# Patient Record
Sex: FEMALE | Race: BLACK OR AFRICAN AMERICAN | NOT HISPANIC OR LATINO
[De-identification: names, ages, dates, MRNs, and addresses within clinical notes are randomized per-mention and may not be internally consistent; named-entity substitution may affect disease eponyms.]

---

## 2017-12-21 ENCOUNTER — APPOINTMENT (OUTPATIENT)
Dept: PULMONOLOGY | Facility: CLINIC | Age: 77
End: 2017-12-21
Payer: MEDICARE

## 2017-12-21 VITALS
OXYGEN SATURATION: 98 % | WEIGHT: 122 LBS | HEART RATE: 58 BPM | SYSTOLIC BLOOD PRESSURE: 120 MMHG | BODY MASS INDEX: 22.31 KG/M2 | DIASTOLIC BLOOD PRESSURE: 60 MMHG

## 2017-12-21 DIAGNOSIS — Z86.39 PERSONAL HISTORY OF OTHER ENDOCRINE, NUTRITIONAL AND METABOLIC DISEASE: ICD-10-CM

## 2017-12-21 DIAGNOSIS — Z87.39 PERSONAL HISTORY OF OTHER DISEASES OF THE MUSCULOSKELETAL SYSTEM AND CONNECTIVE TISSUE: ICD-10-CM

## 2017-12-21 DIAGNOSIS — Z86.79 PERSONAL HISTORY OF OTHER DISEASES OF THE CIRCULATORY SYSTEM: ICD-10-CM

## 2017-12-21 DIAGNOSIS — Z00.00 ENCOUNTER FOR GENERAL ADULT MEDICAL EXAMINATION W/OUT ABNORMAL FINDINGS: ICD-10-CM

## 2017-12-21 DIAGNOSIS — K22.5 DIVERTICULUM OF ESOPHAGUS, ACQUIRED: ICD-10-CM

## 2017-12-21 DIAGNOSIS — R06.02 SHORTNESS OF BREATH: ICD-10-CM

## 2017-12-21 DIAGNOSIS — Z80.3 FAMILY HISTORY OF MALIGNANT NEOPLASM OF BREAST: ICD-10-CM

## 2017-12-21 DIAGNOSIS — Z82.49 FAMILY HISTORY OF ISCHEMIC HEART DISEASE AND OTHER DISEASES OF THE CIRCULATORY SYSTEM: ICD-10-CM

## 2017-12-21 PROCEDURE — 94010 BREATHING CAPACITY TEST: CPT

## 2017-12-21 PROCEDURE — 94729 DIFFUSING CAPACITY: CPT

## 2017-12-21 PROCEDURE — 85018 HEMOGLOBIN: CPT | Mod: QW

## 2017-12-21 PROCEDURE — 99205 OFFICE O/P NEW HI 60 MIN: CPT | Mod: 25

## 2017-12-21 PROCEDURE — 94727 GAS DIL/WSHOT DETER LNG VOL: CPT

## 2021-10-08 ENCOUNTER — RESULT REVIEW (OUTPATIENT)
Age: 81
End: 2021-10-08

## 2023-03-14 ENCOUNTER — OFFICE (OUTPATIENT)
Dept: URBAN - METROPOLITAN AREA CLINIC 104 | Facility: CLINIC | Age: 83
Setting detail: OPHTHALMOLOGY
End: 2023-03-14
Payer: COMMERCIAL

## 2023-03-14 DIAGNOSIS — E11.9: ICD-10-CM

## 2023-03-14 DIAGNOSIS — H18.513: ICD-10-CM

## 2023-03-14 DIAGNOSIS — H04.121: ICD-10-CM

## 2023-03-14 DIAGNOSIS — H21.233: ICD-10-CM

## 2023-03-14 DIAGNOSIS — H26.493: ICD-10-CM

## 2023-03-14 DIAGNOSIS — H40.1133: ICD-10-CM

## 2023-03-14 DIAGNOSIS — H43.813: ICD-10-CM

## 2023-03-14 DIAGNOSIS — Z96.1: ICD-10-CM

## 2023-03-14 PROCEDURE — 99213 OFFICE O/P EST LOW 20 MIN: CPT | Performed by: SPECIALIST

## 2023-03-14 PROCEDURE — 92250 FUNDUS PHOTOGRAPHY W/I&R: CPT | Performed by: SPECIALIST

## 2023-03-14 ASSESSMENT — REFRACTION_CURRENTRX
OS_ADD: +2.75
OD_ADD: +2.75
OD_SPHERE: -0.25
OD_OVR_VA: 20/
OD_AXIS: 50
OS_SPHERE: -0.75
OD_VPRISM_DIRECTION: BF
OS_VPRISM_DIRECTION: BF
OS_OVR_VA: 20/
OD_CYLINDER: -0.25

## 2023-03-14 ASSESSMENT — REFRACTION_AUTOREFRACTION
OS_SPHERE: PLANO
OS_AXIS: 68
OS_CYLINDER: -0.50
OD_AXIS: 89
OD_CYLINDER: -0.50
OD_SPHERE: PLANO

## 2023-03-14 ASSESSMENT — CONFRONTATIONAL VISUAL FIELD TEST (CVF)
OD_FINDINGS: FULL
OS_FINDINGS: FULL

## 2023-03-14 ASSESSMENT — VISUAL ACUITY
OS_BCVA: 20/20
OD_BCVA: 20/25

## 2023-03-14 ASSESSMENT — PACHYMETRY
OD_CT_UM: 549
OS_CT_CORRECTION: 0
OS_CT_UM: 548
OD_CT_CORRECTION: 0

## 2023-03-14 ASSESSMENT — TONOMETRY
OD_IOP_MMHG: 15
OS_IOP_MMHG: 15

## 2023-03-14 ASSESSMENT — CORNEAL DYSTROPHY - POSTERIOR
OS_POSTERIORDYSTROPHY: 3+ GUTTATA
OD_POSTERIORDYSTROPHY: 3+ GUTTATA

## 2023-03-14 ASSESSMENT — TEAR BREAK UP TIME (TBUT): OD_TBUT: 1+

## 2023-05-26 ENCOUNTER — APPOINTMENT (OUTPATIENT)
Dept: OTOLARYNGOLOGY | Facility: CLINIC | Age: 83
End: 2023-05-26
Payer: MEDICARE

## 2023-05-26 VITALS
SYSTOLIC BLOOD PRESSURE: 127 MMHG | DIASTOLIC BLOOD PRESSURE: 75 MMHG | HEIGHT: 62 IN | WEIGHT: 125 LBS | HEART RATE: 85 BPM | BODY MASS INDEX: 23 KG/M2 | OXYGEN SATURATION: 97 %

## 2023-05-26 PROCEDURE — 99204 OFFICE O/P NEW MOD 45 MIN: CPT

## 2023-05-26 NOTE — HISTORY OF PRESENT ILLNESS
[de-identified] : 83 year old female referred by Dr. Al for initial evaluation of thyroid nodule\par Biopsy of left thyroid nodule performed on 1/3/23 which showed rare follicular cells.  R thyroid nodule biopsied on 10/8/21 and that was non diagnostic.  Pt c/o throat pain and chronic cough. Pt is on a pureed diet due to dysphagia.  Pt feels SOB at rest.

## 2023-05-26 NOTE — CONSULT LETTER
[Dear  ___] : Dear  [unfilled], [Courtesy Letter:] : I had the pleasure of seeing your patient, [unfilled], in my office today. [Please see my note below.] : Please see my note below. [Consult Closing:] : Thank you very much for allowing me to participate in the care of this patient.  If you have any questions, please do not hesitate to contact me. [Sincerely,] : Sincerely, [FreeTextEntry2] : Dr Al  [FreeTextEntry3] : \par Neftali Jay MD, FACS\par \par Otolaryngology-Head and Neck Surgery\par [employer] [facility]\par \par

## 2023-06-12 ENCOUNTER — APPOINTMENT (OUTPATIENT)
Dept: ULTRASOUND IMAGING | Facility: CLINIC | Age: 83
End: 2023-06-12

## 2023-06-12 ENCOUNTER — OUTPATIENT (OUTPATIENT)
Dept: OUTPATIENT SERVICES | Facility: HOSPITAL | Age: 83
LOS: 1 days | End: 2023-06-12
Payer: MEDICARE

## 2023-06-12 DIAGNOSIS — E04.2 NONTOXIC MULTINODULAR GOITER: ICD-10-CM

## 2023-06-12 PROCEDURE — 76536 US EXAM OF HEAD AND NECK: CPT | Mod: 26

## 2023-06-16 ENCOUNTER — APPOINTMENT (OUTPATIENT)
Dept: OTOLARYNGOLOGY | Facility: CLINIC | Age: 83
End: 2023-06-16
Payer: MEDICARE

## 2023-06-16 VITALS
HEIGHT: 62 IN | DIASTOLIC BLOOD PRESSURE: 71 MMHG | HEART RATE: 75 BPM | BODY MASS INDEX: 23 KG/M2 | WEIGHT: 125 LBS | SYSTOLIC BLOOD PRESSURE: 146 MMHG

## 2023-06-16 PROCEDURE — 99213 OFFICE O/P EST LOW 20 MIN: CPT | Mod: 25

## 2023-06-16 PROCEDURE — 31231 NASAL ENDOSCOPY DX: CPT

## 2023-06-16 RX ORDER — IPRATROPIUM BROMIDE 21 UG/1
0.03 SPRAY NASAL
Qty: 15 | Refills: 2 | Status: ACTIVE | COMMUNITY
Start: 2023-06-16 | End: 1900-01-01

## 2023-06-16 RX ORDER — BIMATOPROST 0.1 MG/ML
0.01 SOLUTION/ DROPS OPHTHALMIC
Refills: 0 | Status: COMPLETED | COMMUNITY
End: 2023-06-16

## 2023-06-16 RX ORDER — LEVOTHYROXINE SODIUM 0.17 MG/1
TABLET ORAL
Refills: 0 | Status: ACTIVE | COMMUNITY

## 2023-06-16 RX ORDER — DORZOLAMIDE HYDROCHLORIDE AND TIMOLOL MALEATE PRESERVATIVE FREE 20; 5 MG/ML; MG/ML
2-0.5 SOLUTION/ DROPS OPHTHALMIC
Refills: 0 | Status: ACTIVE | COMMUNITY

## 2023-06-16 NOTE — REASON FOR VISIT
[Initial Consultation] : an initial consultation for [FreeTextEntry2] : referred by Dr. Jay for nasal congestion

## 2023-06-16 NOTE — CONSULT LETTER
[Dear  ___] : Dear  [unfilled], [Consult Letter:] : I had the pleasure of evaluating your patient, [unfilled]. [Please see my note below.] : Please see my note below. [Consult Closing:] : Thank you very much for allowing me to participate in the care of this patient.  If you have any questions, please do not hesitate to contact me. [Sincerely,] : Sincerely, [FreeTextEntry2] : Dr. Jay  [FreeTextEntry3] : Torsten Lopez MD, DALLIN\par Otolaryngology\par Sinus and Endoscopic Skull Base Surgery\par Head and Neck Surgery\par \par [address], [facility] [address]\[place] [address]\par \par [address],\[place] [address]\par \par Tel: [phone]\par Fax:[phone]

## 2023-06-16 NOTE — HISTORY OF PRESENT ILLNESS
[de-identified] : 83 year old female referred by Dr. Jay for nasal congestion and epistaxis\par Reports when she coughs and sneeze her nose gets very congested.\par Bilateral epistaxis L>R, states in the past she has had to call 911 when she can not control the bleed. States she has not had a an episode of epistaxis in 5 months since starting Ayr gel, Azelastine and Bacitracinan, and xlear. States has never had cauterization about 5-6 times in the past. Denies any trauma to the nose. Denies anticoagulants. Denies history of bleeding disorders. Denies family history of bleeding disorders. \par Reports frequent post nasal drip, itchiness inside the nose that causes blisters and clear anterior rhinorrhea.\par Does saline rinses 1x weekly due to fear of causing epistaxis.\par Sense of smell good\par Denies recent fevers or sinus infections. \par Was unable to complete MRI of Head/Throat 04/17/2023 due to cough attack

## 2023-06-16 NOTE — PROCEDURE
[FreeTextEntry6] : Procedure: Bilateral nasal endoscopy (CPT 95826) \par \par Indication: Anterior rhinoscopy was inadequate to evaluate pathology.\par \par Left:\par Septum midline, prominent ant vessels\par Moderate inferior turbinate hypertrophy \par Middle meatus clear, without masses, polyps, or pus\par Sphenoethmoidal recess clear, without masses, polyps, or pus\par \par Right: \par Septum midline, prominent ant vessels\par Moderate inferior turbinate hypertrophy\par Middle meatus clear, without masses, polyps, or pus \par Sphenoethmoidal recess clear, without masses, polyps, or pus\par \par

## 2023-09-15 ENCOUNTER — APPOINTMENT (OUTPATIENT)
Dept: OTOLARYNGOLOGY | Facility: CLINIC | Age: 83
End: 2023-09-15
Payer: MEDICARE

## 2023-09-15 VITALS
HEART RATE: 64 BPM | HEIGHT: 62 IN | DIASTOLIC BLOOD PRESSURE: 69 MMHG | BODY MASS INDEX: 23 KG/M2 | WEIGHT: 125 LBS | SYSTOLIC BLOOD PRESSURE: 127 MMHG

## 2023-09-15 PROCEDURE — 99213 OFFICE O/P EST LOW 20 MIN: CPT | Mod: 25

## 2023-09-15 PROCEDURE — 31231 NASAL ENDOSCOPY DX: CPT

## 2023-09-15 RX ORDER — BUDESONIDE 0.5 MG/2ML
0.5 INHALANT ORAL TWICE DAILY
Qty: 3 | Refills: 3 | Status: ACTIVE | COMMUNITY
Start: 2023-09-15 | End: 1900-01-01

## 2023-09-19 ENCOUNTER — OFFICE (OUTPATIENT)
Dept: URBAN - METROPOLITAN AREA CLINIC 104 | Facility: CLINIC | Age: 83
Setting detail: OPHTHALMOLOGY
End: 2023-09-19
Payer: COMMERCIAL

## 2023-09-19 DIAGNOSIS — H18.513: ICD-10-CM

## 2023-09-19 DIAGNOSIS — E11.9: ICD-10-CM

## 2023-09-19 DIAGNOSIS — H40.1133: ICD-10-CM

## 2023-09-19 DIAGNOSIS — H26.493: ICD-10-CM

## 2023-09-19 DIAGNOSIS — H43.813: ICD-10-CM

## 2023-09-19 DIAGNOSIS — H04.121: ICD-10-CM

## 2023-09-19 DIAGNOSIS — Z96.1: ICD-10-CM

## 2023-09-19 PROCEDURE — 92133 CPTRZD OPH DX IMG PST SGM ON: CPT | Performed by: SPECIALIST

## 2023-09-19 PROCEDURE — 92014 COMPRE OPH EXAM EST PT 1/>: CPT | Performed by: SPECIALIST

## 2023-09-19 ASSESSMENT — PACHYMETRY
OD_CT_UM: 549
OS_CT_CORRECTION: 0
OS_CT_UM: 548
OD_CT_CORRECTION: 0

## 2023-09-19 ASSESSMENT — TONOMETRY
OS_IOP_MMHG: 15
OD_IOP_MMHG: 15

## 2023-09-19 ASSESSMENT — VISUAL ACUITY
OD_BCVA: 20/20
OS_BCVA: 20/20

## 2023-09-19 ASSESSMENT — CONFRONTATIONAL VISUAL FIELD TEST (CVF)
OS_FINDINGS: FULL
OD_FINDINGS: FULL

## 2023-09-19 ASSESSMENT — CORNEAL DYSTROPHY - POSTERIOR
OD_POSTERIORDYSTROPHY: 3+ GUTTATA
OS_POSTERIORDYSTROPHY: 3+ GUTTATA

## 2023-09-19 ASSESSMENT — REFRACTION_CURRENTRX
OS_OVR_VA: 20/
OD_OVR_VA: 20/

## 2023-09-19 ASSESSMENT — TEAR BREAK UP TIME (TBUT): OD_TBUT: 1+

## 2023-10-31 ENCOUNTER — APPOINTMENT (OUTPATIENT)
Dept: ULTRASOUND IMAGING | Facility: CLINIC | Age: 83
End: 2023-10-31
Payer: MEDICARE

## 2023-10-31 ENCOUNTER — OUTPATIENT (OUTPATIENT)
Dept: OUTPATIENT SERVICES | Facility: HOSPITAL | Age: 83
LOS: 1 days | End: 2023-10-31

## 2023-10-31 DIAGNOSIS — E04.2 NONTOXIC MULTINODULAR GOITER: ICD-10-CM

## 2023-10-31 PROCEDURE — 76536 US EXAM OF HEAD AND NECK: CPT | Mod: 26

## 2023-11-10 ENCOUNTER — APPOINTMENT (OUTPATIENT)
Dept: OTOLARYNGOLOGY | Facility: CLINIC | Age: 83
End: 2023-11-10
Payer: MEDICARE

## 2023-11-10 VITALS
HEIGHT: 62 IN | WEIGHT: 133 LBS | BODY MASS INDEX: 24.48 KG/M2 | HEART RATE: 67 BPM | DIASTOLIC BLOOD PRESSURE: 61 MMHG | SYSTOLIC BLOOD PRESSURE: 142 MMHG

## 2023-11-10 DIAGNOSIS — E04.2 NONTOXIC MULTINODULAR GOITER: ICD-10-CM

## 2023-11-10 PROCEDURE — 99212 OFFICE O/P EST SF 10 MIN: CPT

## 2023-12-05 RX ORDER — BUDESONIDE 0.5 MG/2ML
0.5 INHALANT ORAL TWICE DAILY
Qty: 3 | Refills: 3 | Status: ACTIVE | COMMUNITY
Start: 2023-12-05 | End: 1900-01-01

## 2024-01-12 ENCOUNTER — APPOINTMENT (OUTPATIENT)
Dept: OTOLARYNGOLOGY | Facility: CLINIC | Age: 84
End: 2024-01-12
Payer: MEDICARE

## 2024-01-12 VITALS
DIASTOLIC BLOOD PRESSURE: 73 MMHG | WEIGHT: 133 LBS | BODY MASS INDEX: 24.48 KG/M2 | SYSTOLIC BLOOD PRESSURE: 136 MMHG | HEIGHT: 62 IN | HEART RATE: 76 BPM

## 2024-01-12 PROCEDURE — 99213 OFFICE O/P EST LOW 20 MIN: CPT | Mod: 25

## 2024-01-12 PROCEDURE — 31231 NASAL ENDOSCOPY DX: CPT

## 2024-01-12 RX ORDER — IPRATROPIUM BROMIDE 21 UG/1
0.03 SPRAY NASAL
Qty: 1 | Refills: 6 | Status: ACTIVE | COMMUNITY
Start: 2024-01-12 | End: 1900-01-01

## 2024-01-12 NOTE — PLAN
[TextEntry] : Continue with budesonide irrigations BID along with atrovent and astelin per her primary care. She is using nasogel for continued humidification. She is doing very well on this regimen.   She has hx of untreated trigeminal neuralgia, but is not interested in neurology referral or treatment at this time.   Follow up in 4 months.

## 2024-01-12 NOTE — PROCEDURE
[FreeTextEntry6] : Procedure: Bilateral nasal endoscopy (CPT 71034)   Indication: Anterior rhinoscopy was inadequate to evaluate pathology.  Left: Septum deviated to L Moderate inferior turbinate hypertrophy  Middle meatus clear, without masses, polyps, or pus Sphenoethmoidal recess clear, without masses, polyps, or pus  Right:  Prominent anterior vessels Moderate inferior turbinate hypertrophy Middle meatus clear, without masses, polyps, or pus  Sphenoethmoidal recess clear, without masses, polyps, or pus

## 2024-01-12 NOTE — CONSULT LETTER
[Dear  ___] : Dear  [unfilled], [Courtesy Letter:] : I had the pleasure of seeing your patient, [unfilled], in my office today. [Please see my note below.] : Please see my note below. [Sincerely,] : Sincerely, [FreeTextEntry2] : Dr. Ethan Freeman [FreeTextEntry3] : Torsten Lopez MD, DALLIN Otolaryngology Sinus and Endoscopic Skull Base Surgery Head and Neck Surgery   [address] [facility] [address] [facility] [facility], [address] [place] [id number] Tel: [phone] Fax:[phone]

## 2024-01-12 NOTE — HISTORY OF PRESENT ILLNESS
[de-identified] : Update 1/12/24: f/u vasomotor rhinitis, epistaxis and LPR, and nasal congestion. Previously started on budesonide irrigations and continues on atrovent and astelin spray, with some noted benefit and improved congestion. No episodes of epistaxis since last visit.  Update 09/15/2023: f/u vasomotor rhinitis, epistaxis and LPR, and nasal congestion. She has been doing atrovent with good benefit for her rhinorrhea. She has been using AYR gel and hsa not had recurrent epistaxis. Continues pantoprazole. Today, she reports that she has been having a lot of trouble breathing through her nose, she has been using azelastine and sinus rinses regularly which help occasionally. She uses breath rite strips at night and afrin very occasionally.   83 year old female referred by Dr. Jay for nasal congestion and epistaxis. Reports when she coughs and sneeze her nose gets very congested. Bilateral epistaxis L>R, states in the past she has had to call 911 when she can not control the bleed. States she has not had a an episode of epistaxis in 5 months since starting Ayr gel, Azelastine and Bacitracinan, and xlear. States has never had cauterization about 5-6 times in the past. Denies any trauma to the nose. Denies anticoagulants. Denies history of bleeding disorders. Denies family history of bleeding disorders.  Reports frequent post nasal drip, itchiness inside the nose that causes blisters and clear anterior rhinorrhea. Does saline rinses 1x weekly due to fear of causing epistaxis. Sense of smell good. Denies recent fevers or sinus infections. Was unable to complete MRI of Head/Throat 04/17/2023 due to cough attack

## 2024-01-30 ENCOUNTER — OFFICE (OUTPATIENT)
Dept: URBAN - METROPOLITAN AREA CLINIC 104 | Facility: CLINIC | Age: 84
Setting detail: OPHTHALMOLOGY
End: 2024-01-30
Payer: COMMERCIAL

## 2024-01-30 DIAGNOSIS — H43.813: ICD-10-CM

## 2024-01-30 DIAGNOSIS — H04.121: ICD-10-CM

## 2024-01-30 DIAGNOSIS — E11.9: ICD-10-CM

## 2024-01-30 DIAGNOSIS — H18.513: ICD-10-CM

## 2024-01-30 DIAGNOSIS — Z96.1: ICD-10-CM

## 2024-01-30 DIAGNOSIS — H40.1133: ICD-10-CM

## 2024-01-30 DIAGNOSIS — H26.493: ICD-10-CM

## 2024-01-30 PROCEDURE — 99213 OFFICE O/P EST LOW 20 MIN: CPT | Performed by: SPECIALIST

## 2024-01-30 PROCEDURE — 92083 EXTENDED VISUAL FIELD XM: CPT | Performed by: SPECIALIST

## 2024-01-30 ASSESSMENT — CORNEAL DYSTROPHY - POSTERIOR
OS_POSTERIORDYSTROPHY: 3+ GUTTATA
OD_POSTERIORDYSTROPHY: 3+ GUTTATA

## 2024-01-30 ASSESSMENT — TEAR BREAK UP TIME (TBUT): OD_TBUT: 1+

## 2024-01-30 ASSESSMENT — CONFRONTATIONAL VISUAL FIELD TEST (CVF)
OD_FINDINGS: FULL
OS_FINDINGS: FULL

## 2024-05-09 PROBLEM — R09.81 NASAL CONGESTION: Status: ACTIVE | Noted: 2023-09-15

## 2024-05-09 PROBLEM — J30.0 CHRONIC VASOMOTOR RHINITIS: Status: ACTIVE | Noted: 2023-06-16

## 2024-05-10 ENCOUNTER — APPOINTMENT (OUTPATIENT)
Dept: OTOLARYNGOLOGY | Facility: CLINIC | Age: 84
End: 2024-05-10
Payer: MEDICARE

## 2024-05-10 VITALS
HEART RATE: 85 BPM | BODY MASS INDEX: 24.48 KG/M2 | SYSTOLIC BLOOD PRESSURE: 144 MMHG | WEIGHT: 133 LBS | DIASTOLIC BLOOD PRESSURE: 66 MMHG | HEIGHT: 62 IN

## 2024-05-10 DIAGNOSIS — J30.0 VASOMOTOR RHINITIS: ICD-10-CM

## 2024-05-10 DIAGNOSIS — R09.81 NASAL CONGESTION: ICD-10-CM

## 2024-05-10 DIAGNOSIS — K21.9 GASTRO-ESOPHAGEAL REFLUX DISEASE W/OUT ESOPHAGITIS: ICD-10-CM

## 2024-05-10 PROCEDURE — 99214 OFFICE O/P EST MOD 30 MIN: CPT | Mod: 25

## 2024-05-10 PROCEDURE — 31231 NASAL ENDOSCOPY DX: CPT

## 2024-05-10 RX ORDER — AZELASTINE HYDROCHLORIDE 137 UG/1
137 SPRAY, METERED NASAL TWICE DAILY
Qty: 1 | Refills: 2 | Status: ACTIVE | COMMUNITY
Start: 2024-05-10 | End: 1900-01-01

## 2024-05-10 RX ORDER — BUDESONIDE 0.5 MG/2ML
0.5 INHALANT ORAL TWICE DAILY
Qty: 3 | Refills: 3 | Status: ACTIVE | COMMUNITY
Start: 2024-05-10 | End: 1900-01-01

## 2024-05-11 NOTE — PLAN
[TextEntry] : Continue budesonide irrigations, astelin and nasogel. She can stop flonase. Advised to stop sinex as it can cause rebound congestion. Advised to only use atrovent for rhinitis when necessary.  Follow up in 6 months.

## 2024-05-11 NOTE — HISTORY OF PRESENT ILLNESS
[de-identified] : Update 5/10/24: f/u vasomotor rhinitis, nasal congestion. Using budesonide irrigations, atrovent, astelin and nasogel. She reports no longer having epistaxis. She has nasal congestion but budesonide has been helping. She denies PND or rhinorrhea.  Update 1/12/24: f/u vasomotor rhinitis, epistaxis and LPR, and nasal congestion. Previously started on budesonide irrigations and continues on atrovent and astelin spray, with some noted benefit and improved congestion. No episodes of epistaxis since last visit.  Update 09/15/2023: f/u vasomotor rhinitis, epistaxis and LPR, and nasal congestion. She has been doing atrovent with good benefit for her rhinorrhea. She has been using AYR gel and hsa not had recurrent epistaxis. Continues pantoprazole. Today, she reports that she has been having a lot of trouble breathing through her nose, she has been using azelastine and sinus rinses regularly which help occasionally. She uses breath rite strips at night and afrin very occasionally.   83 year old female referred by Dr. Jay for nasal congestion and epistaxis. Reports when she coughs and sneeze her nose gets very congested. Bilateral epistaxis L>R, states in the past she has had to call 911 when she can not control the bleed. States she has not had a an episode of epistaxis in 5 months since starting Ayr gel, Azelastine and Bacitracinan, and xlear. States has never had cauterization about 5-6 times in the past. Denies any trauma to the nose. Denies anticoagulants. Denies history of bleeding disorders. Denies family history of bleeding disorders.  Reports frequent post nasal drip, itchiness inside the nose that causes blisters and clear anterior rhinorrhea. Does saline rinses 1x weekly due to fear of causing epistaxis. Sense of smell good. Denies recent fevers or sinus infections. Was unable to complete MRI of Head/Throat 04/17/2023 due to cough attack

## 2024-05-13 ENCOUNTER — APPOINTMENT (OUTPATIENT)
Dept: ULTRASOUND IMAGING | Facility: CLINIC | Age: 84
End: 2024-05-13

## 2024-05-13 ENCOUNTER — OUTPATIENT (OUTPATIENT)
Dept: OUTPATIENT SERVICES | Facility: HOSPITAL | Age: 84
LOS: 1 days | End: 2024-05-13

## 2024-05-13 ENCOUNTER — OUTPATIENT (OUTPATIENT)
Dept: OUTPATIENT SERVICES | Facility: HOSPITAL | Age: 84
LOS: 1 days | End: 2024-05-13
Payer: MEDICARE

## 2024-05-13 DIAGNOSIS — Z00.8 ENCOUNTER FOR OTHER GENERAL EXAMINATION: ICD-10-CM

## 2024-05-13 DIAGNOSIS — E04.2 NONTOXIC MULTINODULAR GOITER: ICD-10-CM

## 2024-05-13 PROCEDURE — 76536 US EXAM OF HEAD AND NECK: CPT | Mod: 26

## 2024-05-30 ENCOUNTER — OFFICE (OUTPATIENT)
Dept: URBAN - METROPOLITAN AREA CLINIC 104 | Facility: CLINIC | Age: 84
Setting detail: OPHTHALMOLOGY
End: 2024-05-30
Payer: MEDICARE

## 2024-05-30 DIAGNOSIS — E11.9: ICD-10-CM

## 2024-05-30 DIAGNOSIS — H43.813: ICD-10-CM

## 2024-05-30 DIAGNOSIS — H04.121: ICD-10-CM

## 2024-05-30 DIAGNOSIS — H26.493: ICD-10-CM

## 2024-05-30 DIAGNOSIS — H40.1133: ICD-10-CM

## 2024-05-30 DIAGNOSIS — Z96.1: ICD-10-CM

## 2024-05-30 DIAGNOSIS — H18.513: ICD-10-CM

## 2024-05-30 PROCEDURE — 92250 FUNDUS PHOTOGRAPHY W/I&R: CPT | Performed by: SPECIALIST

## 2024-05-30 PROCEDURE — 99213 OFFICE O/P EST LOW 20 MIN: CPT | Performed by: SPECIALIST

## 2024-05-30 ASSESSMENT — CONFRONTATIONAL VISUAL FIELD TEST (CVF)
OS_FINDINGS: FULL
OD_FINDINGS: FULL

## 2024-10-17 ENCOUNTER — OFFICE (OUTPATIENT)
Dept: URBAN - METROPOLITAN AREA CLINIC 104 | Facility: CLINIC | Age: 84
Setting detail: OPHTHALMOLOGY
End: 2024-10-17
Payer: MEDICARE

## 2024-10-17 DIAGNOSIS — E11.9: ICD-10-CM

## 2024-10-17 DIAGNOSIS — H18.513: ICD-10-CM

## 2024-10-17 DIAGNOSIS — H26.493: ICD-10-CM

## 2024-10-17 DIAGNOSIS — H40.1133: ICD-10-CM

## 2024-10-17 DIAGNOSIS — H04.121: ICD-10-CM

## 2024-10-17 DIAGNOSIS — Z96.1: ICD-10-CM

## 2024-10-17 DIAGNOSIS — H43.813: ICD-10-CM

## 2024-10-17 PROCEDURE — 92014 COMPRE OPH EXAM EST PT 1/>: CPT | Performed by: SPECIALIST

## 2024-10-17 PROCEDURE — 92133 CPTRZD OPH DX IMG PST SGM ON: CPT | Performed by: SPECIALIST

## 2024-10-17 ASSESSMENT — PACHYMETRY
OD_CT_CORRECTION: 0
OS_CT_UM: 548
OD_CT_UM: 549
OS_CT_CORRECTION: 0

## 2024-10-17 ASSESSMENT — CORNEAL DYSTROPHY - POSTERIOR
OS_POSTERIORDYSTROPHY: 3+ GUTTATA
OD_POSTERIORDYSTROPHY: 3+ GUTTATA

## 2024-10-17 ASSESSMENT — CONFRONTATIONAL VISUAL FIELD TEST (CVF)
OS_FINDINGS: FULL
OD_FINDINGS: FULL

## 2024-10-17 ASSESSMENT — TEAR BREAK UP TIME (TBUT): OD_TBUT: 1+

## 2024-10-17 ASSESSMENT — TONOMETRY
OS_IOP_MMHG: 16
OD_IOP_MMHG: 16

## 2024-10-17 ASSESSMENT — VISUAL ACUITY
OD_BCVA: 20/25
OS_BCVA: 20/25

## 2024-11-12 ENCOUNTER — APPOINTMENT (OUTPATIENT)
Dept: OTOLARYNGOLOGY | Facility: CLINIC | Age: 84
End: 2024-11-12
Payer: MEDICARE

## 2024-11-12 VITALS
SYSTOLIC BLOOD PRESSURE: 143 MMHG | BODY MASS INDEX: 23.92 KG/M2 | HEIGHT: 62 IN | DIASTOLIC BLOOD PRESSURE: 67 MMHG | WEIGHT: 130 LBS | HEART RATE: 81 BPM

## 2024-11-12 DIAGNOSIS — E04.2 NONTOXIC MULTINODULAR GOITER: ICD-10-CM

## 2024-11-12 PROCEDURE — 99212 OFFICE O/P EST SF 10 MIN: CPT

## 2024-12-31 ENCOUNTER — APPOINTMENT (OUTPATIENT)
Dept: OTOLARYNGOLOGY | Facility: CLINIC | Age: 84
End: 2024-12-31
Payer: MEDICARE

## 2024-12-31 VITALS
BODY MASS INDEX: 23.92 KG/M2 | WEIGHT: 130 LBS | HEIGHT: 62 IN | DIASTOLIC BLOOD PRESSURE: 67 MMHG | HEART RATE: 79 BPM | SYSTOLIC BLOOD PRESSURE: 157 MMHG

## 2024-12-31 DIAGNOSIS — R09.81 NASAL CONGESTION: ICD-10-CM

## 2024-12-31 DIAGNOSIS — J30.0 VASOMOTOR RHINITIS: ICD-10-CM

## 2024-12-31 PROCEDURE — 31231 NASAL ENDOSCOPY DX: CPT

## 2024-12-31 PROCEDURE — 99213 OFFICE O/P EST LOW 20 MIN: CPT | Mod: 25

## 2025-02-18 ENCOUNTER — OFFICE (OUTPATIENT)
Dept: URBAN - METROPOLITAN AREA CLINIC 104 | Facility: CLINIC | Age: 85
Setting detail: OPHTHALMOLOGY
End: 2025-02-18
Payer: MEDICARE

## 2025-02-18 DIAGNOSIS — E11.9: ICD-10-CM

## 2025-02-18 DIAGNOSIS — H18.513: ICD-10-CM

## 2025-02-18 DIAGNOSIS — Z96.1: ICD-10-CM

## 2025-02-18 DIAGNOSIS — H04.121: ICD-10-CM

## 2025-02-18 DIAGNOSIS — H40.1133: ICD-10-CM

## 2025-02-18 DIAGNOSIS — H26.493: ICD-10-CM

## 2025-02-18 DIAGNOSIS — H43.813: ICD-10-CM

## 2025-02-18 PROCEDURE — 92083 EXTENDED VISUAL FIELD XM: CPT | Performed by: SPECIALIST

## 2025-02-18 PROCEDURE — 99213 OFFICE O/P EST LOW 20 MIN: CPT | Performed by: SPECIALIST

## 2025-02-18 ASSESSMENT — REFRACTION_MANIFEST
OS_SPHERE: +0.75
OS_ADD: +2.75
OS_AXIS: 70
OD_VA1: 20/20
OD_CYLINDER: --1.00
OS_VA1: 20/25
OD_SPHERE: +0.75
OD_ADD: +2.75
OS_VA2: 20/20
OD_VA2: 20/20
OD_AXIS: 75
OS_CYLINDER: -1.00

## 2025-02-18 ASSESSMENT — REFRACTION_CURRENTRX
OS_OVR_VA: 20/
OS_SPHERE: -0.75
OS_AXIS: 112
OS_ADD: +2.75
OS_CYLINDER: -0.25
OD_SPHERE: -0.25
OD_CYLINDER: -0.25
OD_OVR_VA: 20/
OD_ADD: +2.75
OD_AXIS: 45

## 2025-02-18 ASSESSMENT — CONFRONTATIONAL VISUAL FIELD TEST (CVF)
OD_FINDINGS: FULL
OS_FINDINGS: FULL

## 2025-02-18 ASSESSMENT — REFRACTION_AUTOREFRACTION
OS_SPHERE: +0.75
OS_CYLINDER: -1.50
OD_CYLINDER: -1.25
OD_SPHERE: +1.00
OS_AXIS: 70
OD_AXIS: 74

## 2025-02-18 ASSESSMENT — PACHYMETRY
OD_CT_CORRECTION: 0
OS_CT_UM: 548
OD_CT_UM: 549
OS_CT_CORRECTION: 0

## 2025-02-18 ASSESSMENT — KERATOMETRY
OS_K1POWER_DIOPTERS: 43.77
OD_K2POWER_DIOPTERS: 44.23
OS_K2POWER_DIOPTERS: 44.23
OS_AXISANGLE_DEGREES: 150
OD_K1POWER_DIOPTERS: 43.83
OD_AXISANGLE_DEGREES: 127

## 2025-02-18 ASSESSMENT — CORNEAL DYSTROPHY - POSTERIOR
OS_POSTERIORDYSTROPHY: 3+ GUTTATA
OD_POSTERIORDYSTROPHY: 3+ GUTTATA

## 2025-02-18 ASSESSMENT — VISUAL ACUITY
OD_BCVA: 20/30-2
OS_BCVA: 20/20-2

## 2025-02-18 ASSESSMENT — TONOMETRY
OD_IOP_MMHG: 14
OS_IOP_MMHG: 15

## 2025-02-18 ASSESSMENT — TEAR BREAK UP TIME (TBUT): OD_TBUT: 1+

## 2025-03-04 ENCOUNTER — APPOINTMENT (OUTPATIENT)
Dept: OTOLARYNGOLOGY | Facility: CLINIC | Age: 85
End: 2025-03-04
Payer: MEDICARE

## 2025-03-04 VITALS
DIASTOLIC BLOOD PRESSURE: 64 MMHG | HEART RATE: 70 BPM | BODY MASS INDEX: 23.92 KG/M2 | SYSTOLIC BLOOD PRESSURE: 137 MMHG | HEIGHT: 62 IN | WEIGHT: 130 LBS

## 2025-03-04 DIAGNOSIS — J30.0 VASOMOTOR RHINITIS: ICD-10-CM

## 2025-03-04 DIAGNOSIS — R09.81 NASAL CONGESTION: ICD-10-CM

## 2025-03-04 PROCEDURE — 99213 OFFICE O/P EST LOW 20 MIN: CPT | Mod: 25

## 2025-03-04 PROCEDURE — 31231 NASAL ENDOSCOPY DX: CPT

## 2025-03-19 RX ORDER — IPRATROPIUM BROMIDE 21 UG/1
0.03 SPRAY, METERED NASAL 3 TIMES DAILY
Qty: 1 | Refills: 3 | Status: ACTIVE | COMMUNITY
Start: 2025-03-19 | End: 1900-01-01

## 2025-05-13 ENCOUNTER — APPOINTMENT (OUTPATIENT)
Dept: OTOLARYNGOLOGY | Facility: CLINIC | Age: 85
End: 2025-05-13
Payer: MEDICARE

## 2025-05-13 VITALS
HEIGHT: 62 IN | HEART RATE: 72 BPM | SYSTOLIC BLOOD PRESSURE: 149 MMHG | WEIGHT: 130 LBS | BODY MASS INDEX: 23.92 KG/M2 | DIASTOLIC BLOOD PRESSURE: 72 MMHG

## 2025-05-13 DIAGNOSIS — E04.2 NONTOXIC MULTINODULAR GOITER: ICD-10-CM

## 2025-05-13 PROCEDURE — 99212 OFFICE O/P EST SF 10 MIN: CPT

## 2025-05-13 RX ORDER — SUCRALFATE 1 G/10ML
1 SUSPENSION ORAL
Refills: 0 | Status: ACTIVE | COMMUNITY

## 2025-05-13 RX ORDER — ATORVASTATIN CALCIUM 10 MG/1
10 TABLET, FILM COATED ORAL
Refills: 0 | Status: ACTIVE | COMMUNITY

## 2025-05-13 RX ORDER — LUBIPROSTONE 8 UG/1
8 CAPSULE ORAL
Refills: 0 | Status: ACTIVE | COMMUNITY

## 2025-05-13 RX ORDER — FAMOTIDINE 40 MG/1
40 TABLET, FILM COATED ORAL
Refills: 0 | Status: ACTIVE | COMMUNITY

## 2025-05-13 RX ORDER — SIMETHICONE 125 MG
125 CAPSULE ORAL
Refills: 0 | Status: ACTIVE | COMMUNITY

## 2025-05-13 RX ORDER — AMLODIPINE BESYLATE 10 MG/1
10 TABLET ORAL
Refills: 0 | Status: ACTIVE | COMMUNITY

## 2025-05-13 RX ORDER — CARBOXYMETHYLCELLULOSE SODIUM 5 MG/ML
SOLUTION/ DROPS OPHTHALMIC
Refills: 0 | Status: ACTIVE | COMMUNITY

## 2025-05-13 RX ORDER — SOLIFENACIN SUCCINATE 10 MG/1
10 TABLET ORAL
Refills: 0 | Status: ACTIVE | COMMUNITY

## 2025-05-13 RX ORDER — DICLOFENAC SODIUM 10 MG/G
1 GEL TOPICAL
Refills: 0 | Status: ACTIVE | COMMUNITY

## 2025-05-20 ENCOUNTER — OUTPATIENT (OUTPATIENT)
Dept: OUTPATIENT SERVICES | Facility: HOSPITAL | Age: 85
LOS: 1 days | End: 2025-05-20

## 2025-05-20 ENCOUNTER — APPOINTMENT (OUTPATIENT)
Dept: ULTRASOUND IMAGING | Facility: CLINIC | Age: 85
End: 2025-05-20
Payer: MEDICARE

## 2025-05-20 DIAGNOSIS — Z00.8 ENCOUNTER FOR OTHER GENERAL EXAMINATION: ICD-10-CM

## 2025-05-20 PROCEDURE — 76536 US EXAM OF HEAD AND NECK: CPT | Mod: 26

## 2025-06-11 RX ORDER — IPRATROPIUM BROMIDE 21 UG/1
0.03 SPRAY, METERED NASAL 3 TIMES DAILY
Qty: 1 | Refills: 3 | Status: ACTIVE | COMMUNITY
Start: 2025-06-11 | End: 1900-01-01

## 2025-07-08 ENCOUNTER — OFFICE (OUTPATIENT)
Dept: URBAN - METROPOLITAN AREA CLINIC 104 | Facility: CLINIC | Age: 85
Setting detail: OPHTHALMOLOGY
End: 2025-07-08
Payer: MEDICARE

## 2025-07-08 DIAGNOSIS — E11.9: ICD-10-CM

## 2025-07-08 DIAGNOSIS — H43.813: ICD-10-CM

## 2025-07-08 DIAGNOSIS — H18.513: ICD-10-CM

## 2025-07-08 DIAGNOSIS — H40.1133: ICD-10-CM

## 2025-07-08 DIAGNOSIS — Z96.1: ICD-10-CM

## 2025-07-08 DIAGNOSIS — H26.493: ICD-10-CM

## 2025-07-08 DIAGNOSIS — H04.121: ICD-10-CM

## 2025-07-08 PROCEDURE — 92014 COMPRE OPH EXAM EST PT 1/>: CPT | Performed by: SPECIALIST

## 2025-07-08 PROCEDURE — 92133 CPTRZD OPH DX IMG PST SGM ON: CPT | Performed by: SPECIALIST

## 2025-07-08 ASSESSMENT — REFRACTION_CURRENTRX
OD_OVR_VA: 20/
OS_CYLINDER: SPH
OS_SPHERE: -0.75
OD_AXIS: 045
OS_ADD: +2.75
OS_OVR_VA: 20/
OD_SPHERE: -0.25
OD_CYLINDER: -0.25
OS_AXIS: 000
OD_ADD: +2.75

## 2025-07-08 ASSESSMENT — REFRACTION_MANIFEST
OS_SPHERE: -0.75
OD_VA1: 20/25-1
OD_CYLINDER: -0.25
OS_CYLINDER: SPH
OS_VA1: 20/25
OD_SPHERE: -0.25
OS_AXIS: 000
OD_AXIS: 045

## 2025-07-08 ASSESSMENT — CONFRONTATIONAL VISUAL FIELD TEST (CVF)
OD_FINDINGS: FULL
OS_FINDINGS: FULL

## 2025-07-08 ASSESSMENT — VISUAL ACUITY
OD_BCVA: 20/30-2
OS_BCVA: 20/25-1

## 2025-07-08 ASSESSMENT — REFRACTION_AUTOREFRACTION
OS_SPHERE: 0.00
OD_AXIS: 062
OS_CYLINDER: -0.50
OD_CYLINDER: -1.00
OD_SPHERE: 0.00
OS_AXIS: 078

## 2025-07-08 ASSESSMENT — KERATOMETRY
OD_AXISANGLE_DEGREES: 127
OD_K2POWER_DIOPTERS: 44.23
OS_K1POWER_DIOPTERS: 43.77
OS_K2POWER_DIOPTERS: 44.23
OD_K1POWER_DIOPTERS: 43.83
OS_AXISANGLE_DEGREES: 150

## 2025-07-08 ASSESSMENT — CORNEAL DYSTROPHY - POSTERIOR
OD_POSTERIORDYSTROPHY: 3+ GUTTATA
OS_POSTERIORDYSTROPHY: 3+ GUTTATA

## 2025-07-08 ASSESSMENT — TEAR BREAK UP TIME (TBUT): OD_TBUT: 1+

## 2025-07-09 RX ORDER — IPRATROPIUM BROMIDE 21 UG/1
0.03 SPRAY, METERED NASAL 3 TIMES DAILY
Qty: 3 | Refills: 3 | Status: ACTIVE | COMMUNITY
Start: 2025-07-09 | End: 1900-01-01

## 2025-08-12 ENCOUNTER — OFFICE (OUTPATIENT)
Dept: URBAN - METROPOLITAN AREA CLINIC 104 | Facility: CLINIC | Age: 85
Setting detail: OPHTHALMOLOGY
End: 2025-08-12
Payer: MEDICARE

## 2025-08-12 DIAGNOSIS — H04.121: ICD-10-CM

## 2025-08-12 DIAGNOSIS — H18.513: ICD-10-CM

## 2025-08-12 DIAGNOSIS — H26.493: ICD-10-CM

## 2025-08-12 DIAGNOSIS — Z96.1: ICD-10-CM

## 2025-08-12 DIAGNOSIS — E11.9: ICD-10-CM

## 2025-08-12 DIAGNOSIS — H43.813: ICD-10-CM

## 2025-08-12 DIAGNOSIS — H40.1133: ICD-10-CM

## 2025-08-12 PROCEDURE — 92133 CPTRZD OPH DX IMG PST SGM ON: CPT | Performed by: SPECIALIST

## 2025-08-12 PROCEDURE — 92014 COMPRE OPH EXAM EST PT 1/>: CPT | Performed by: SPECIALIST

## 2025-08-12 ASSESSMENT — PACHYMETRY
OD_CT_CORRECTION: 0
OS_CT_CORRECTION: 0
OD_CT_UM: 549
OS_CT_UM: 548

## 2025-08-12 ASSESSMENT — TEAR BREAK UP TIME (TBUT): OD_TBUT: 1+

## 2025-08-12 ASSESSMENT — CONFRONTATIONAL VISUAL FIELD TEST (CVF)
OD_FINDINGS: FULL
OS_FINDINGS: FULL

## 2025-08-12 ASSESSMENT — REFRACTION_CURRENTRX
OS_OVR_VA: 20/
OD_OVR_VA: 20/

## 2025-08-12 ASSESSMENT — TONOMETRY
OD_IOP_MMHG: 17
OS_IOP_MMHG: 17

## 2025-08-12 ASSESSMENT — VISUAL ACUITY
OD_BCVA: 20/30
OS_BCVA: 20/30

## 2025-08-12 ASSESSMENT — CORNEAL DYSTROPHY - POSTERIOR
OS_POSTERIORDYSTROPHY: 3+ GUTTATA
OD_POSTERIORDYSTROPHY: 3+ GUTTATA